# Patient Record
Sex: FEMALE | Race: WHITE | HISPANIC OR LATINO | URBAN - METROPOLITAN AREA
[De-identification: names, ages, dates, MRNs, and addresses within clinical notes are randomized per-mention and may not be internally consistent; named-entity substitution may affect disease eponyms.]

---

## 2023-11-04 ENCOUNTER — APPOINTMENT (OUTPATIENT)
Dept: GENERAL RADIOLOGY | Facility: HOSPITAL | Age: 38
End: 2023-11-04
Payer: COMMERCIAL

## 2023-11-04 ENCOUNTER — HOSPITAL ENCOUNTER (EMERGENCY)
Facility: HOSPITAL | Age: 38
Discharge: HOME | End: 2023-11-04
Attending: EMERGENCY MEDICINE
Payer: COMMERCIAL

## 2023-11-04 VITALS
WEIGHT: 148 LBS | SYSTOLIC BLOOD PRESSURE: 124 MMHG | DIASTOLIC BLOOD PRESSURE: 81 MMHG | BODY MASS INDEX: 23.78 KG/M2 | HEIGHT: 66 IN | TEMPERATURE: 98.5 F | RESPIRATION RATE: 16 BRPM | HEART RATE: 82 BPM | OXYGEN SATURATION: 94 %

## 2023-11-04 DIAGNOSIS — S39.012A ACUTE MYOFASCIAL STRAIN OF LUMBAR REGION, INITIAL ENCOUNTER: Primary | ICD-10-CM

## 2023-11-04 LAB
B-HCG UR QL: NEGATIVE
BILIRUB UR QL STRIP: NEGATIVE
CLARITY UR: CLEAR
COLOR UR: YELLOW
GLUCOSE UR STRIP-MCNC: NEGATIVE MG/DL
HGB UR QL STRIP.AUTO: NEGATIVE
KETONES UR QL STRIP: ABNORMAL
LEUKOCYTE ESTERASE UR QL STRIP.AUTO: NEGATIVE
NITRITE UR QL STRIP: NEGATIVE
PH UR STRIP.AUTO: 5.5 [PH] (ref 5–8)
PROT UR QL STRIP: NEGATIVE
SP GR UR STRIP: >=1.03 (ref 1–1.03)
UROBILINOGEN UR QL STRIP: ABNORMAL

## 2023-11-04 PROCEDURE — 99283 EMERGENCY DEPT VISIT LOW MDM: CPT

## 2023-11-04 PROCEDURE — 96372 THER/PROPH/DIAG INJ SC/IM: CPT

## 2023-11-04 PROCEDURE — 81025 URINE PREGNANCY TEST: CPT | Performed by: EMERGENCY MEDICINE

## 2023-11-04 PROCEDURE — 72110 X-RAY EXAM L-2 SPINE 4/>VWS: CPT

## 2023-11-04 PROCEDURE — 25010000002 KETOROLAC TROMETHAMINE PER 15 MG: Performed by: EMERGENCY MEDICINE

## 2023-11-04 PROCEDURE — 81003 URINALYSIS AUTO W/O SCOPE: CPT | Performed by: EMERGENCY MEDICINE

## 2023-11-04 RX ORDER — NAPROXEN 500 MG/1
500 TABLET ORAL 2 TIMES DAILY WITH MEALS
Qty: 14 TABLET | Refills: 0 | Status: SHIPPED | OUTPATIENT
Start: 2023-11-04

## 2023-11-04 RX ORDER — LIDOCAINE 50 MG/G
1 PATCH TOPICAL EVERY 24 HOURS
Qty: 15 EACH | Refills: 0 | Status: SHIPPED | OUTPATIENT
Start: 2023-11-04

## 2023-11-04 RX ORDER — CYCLOBENZAPRINE HCL 10 MG
10 TABLET ORAL ONCE
Status: COMPLETED | OUTPATIENT
Start: 2023-11-04 | End: 2023-11-04

## 2023-11-04 RX ORDER — HYDROCODONE BITARTRATE AND ACETAMINOPHEN 7.5; 325 MG/1; MG/1
1 TABLET ORAL ONCE
Status: COMPLETED | OUTPATIENT
Start: 2023-11-04 | End: 2023-11-04

## 2023-11-04 RX ORDER — LIDOCAINE 50 MG/G
1 PATCH TOPICAL
Status: DISCONTINUED | OUTPATIENT
Start: 2023-11-04 | End: 2023-11-04 | Stop reason: HOSPADM

## 2023-11-04 RX ORDER — CYCLOBENZAPRINE HCL 10 MG
10 TABLET ORAL 3 TIMES DAILY PRN
Qty: 15 TABLET | Refills: 0 | Status: SHIPPED | OUTPATIENT
Start: 2023-11-04

## 2023-11-04 RX ORDER — KETOROLAC TROMETHAMINE 30 MG/ML
60 INJECTION, SOLUTION INTRAMUSCULAR; INTRAVENOUS ONCE
Status: COMPLETED | OUTPATIENT
Start: 2023-11-04 | End: 2023-11-04

## 2023-11-04 RX ADMIN — KETOROLAC TROMETHAMINE 60 MG: 30 INJECTION INTRAMUSCULAR; INTRAVENOUS at 15:19

## 2023-11-04 RX ADMIN — CYCLOBENZAPRINE 10 MG: 10 TABLET, FILM COATED ORAL at 13:29

## 2023-11-04 RX ADMIN — HYDROCODONE BITARTRATE AND ACETAMINOPHEN 1 TABLET: 7.5; 325 TABLET ORAL at 13:28

## 2023-11-04 RX ADMIN — LIDOCAINE 1 PATCH: 50 PATCH TOPICAL at 13:29

## 2023-11-04 NOTE — DISCHARGE INSTRUCTIONS
Go home and rest for the next 24 hours.  Take medications as needed.  Follow-up with your doctor if not better in several days

## 2023-11-04 NOTE — ED PROVIDER NOTES
EMERGENCY DEPARTMENT ENCOUNTER    Room Number:    Date seen:  2023  PCP: Roaslio Drake APRN  Historian: Patient      HPI:  Chief Complaint: Back pain  Context: Gabriela Alcazar is a 38 y.o. female who presents to the ED c/o right back pain since yesterday.  The patient works as a caregiver.  She states he had the same pain approximately 14 years ago and was put on medications and was told she has back problems.      PAST MEDICAL HISTORY  Active Ambulatory Problems     Diagnosis Date Noted    No Active Ambulatory Problems     Resolved Ambulatory Problems     Diagnosis Date Noted    No Resolved Ambulatory Problems     Past Medical History:   Diagnosis Date    Migraines          REVIEW OF SYSTEMS  All systems reviewed and negative except for those discussed in HPI.       PAST SURGICAL HISTORY  Past Surgical History:   Procedure Laterality Date    BREAST AUGMENTATION      BREAST SURGERY      implant removal     SECTION WITH TUBAL           FAMILY HISTORY  Family History   Problem Relation Age of Onset    Breast cancer Maternal Aunt     Ovarian cancer Neg Hx     Uterine cancer Neg Hx     Colon cancer Neg Hx          SOCIAL HISTORY  Social History     Socioeconomic History    Marital status: Single   Tobacco Use    Smoking status: Never    Smokeless tobacco: Never   Vaping Use    Vaping Use: Never used   Substance and Sexual Activity    Alcohol use: Not Currently    Drug use: Never    Sexual activity: Yes     Partners: Male     Birth control/protection: Tubal ligation         ALLERGIES  Patient has no known allergies.      PHYSICAL EXAM  ED Triage Vitals   Temp Heart Rate Resp BP SpO2   23 1150 23 1150 23 1150 23 1151 23 1150   98.5 °F (36.9 °C) 82 16 124/81 94 %      Temp src Heart Rate Source Patient Position BP Location FiO2 (%)   23 1150 23 1150 -- -- --   Tympanic Monitor          Physical Exam      GENERAL: 38-year-old female in no acute distress  HENT:  NCAT: nares patent: Neck supple  EYES: no scleral icterus  CV: regular rhythm, normal rate  RESPIRATORY: normal effort  ABDOMEN: soft, NTND: Bowel sounds positive  Back: Mild right paraspinal tenderness but no midline tenderness  MUSCULOSKELETAL: no deformity: Full range of motion of all 4 extremities without difficulty.  NEURO: alert and oriented x3 with a normal neuro exam: Specifically 5/5 strength in bilateral lower extremities with a stable gait  PSYCH:  calm, cooperative  SKIN: warm, dry    Vital signs and nursing notes reviewed.      LAB RESULTS  Recent Results (from the past 24 hour(s))   Pregnancy, Urine - Urine, Clean Catch    Collection Time: 11/04/23  1:30 PM    Specimen: Urine, Clean Catch   Result Value Ref Range    HCG, Urine QL Negative Negative   Urinalysis With Microscopic If Indicated (No Culture) - Urine, Clean Catch    Collection Time: 11/04/23  1:30 PM    Specimen: Urine, Clean Catch   Result Value Ref Range    Color, UA Yellow Yellow, Straw    Appearance, UA Clear Clear    pH, UA 5.5 5.0 - 8.0    Specific Gravity, UA >=1.030 1.005 - 1.030    Glucose, UA Negative Negative    Ketones, UA 15 mg/dL (1+) (A) Negative    Bilirubin, UA Negative Negative    Blood, UA Negative Negative    Protein, UA Negative Negative    Leuk Esterase, UA Negative Negative    Nitrite, UA Negative Negative    Urobilinogen, UA 0.2 E.U./dL 0.2 - 1.0 E.U./dL       Ordered the above labs and reviewed the results.        RADIOLOGY  XR Spine Lumbar Complete 4+VW    Result Date: 11/4/2023  XR SPINE LUMBAR COMPLETE 4+VW-  INDICATIONS: Pain.  TECHNIQUE: 5 VIEWS OF THE LUMBAR SPINE  COMPARISON: None available  FINDINGS:  No acute fracture, erosion, or dislocation is identified. Mild to space narrowing at L5/S1. Vertebral body heights appear preserved. No acute fracture is identified. If further imaging evaluation is indicated, MRI could be considered.       As described.    This report was finalized on 11/4/2023 3:12 PM by   Balwinder Prado M.D [facility]       Ordered the above noted radiological studies. Reviewed by me in PACS.            PROCEDURES  Procedures          MEDICATIONS GIVEN IN ER  Medications   lidocaine (LIDODERM) 5 % 1 patch (1 patch Transdermal Medication Applied 11/4/23 1329)   HYDROcodone-acetaminophen (NORCO) 7.5-325 MG per tablet 1 tablet (1 tablet Oral Given 11/4/23 1328)   cyclobenzaprine (FLEXERIL) tablet 10 mg (10 mg Oral Given 11/4/23 1329)   ketorolac (TORADOL) injection 60 mg (60 mg Intramuscular Given 11/4/23 1519)             MEDICAL DECISION MAKING, PROGRESS, and CONSULTS    All labs have been independently reviewed by me.  All radiology studies have been reviewed by me and I have also reviewed the radiology report.   EKG's independently viewed and interpreted by me.  Discussion below represents my analysis of pertinent findings related to patient's condition, differential diagnosis, treatment plan and final disposition.          Orders placed during this visit:  Orders Placed This Encounter   Procedures    XR Spine Lumbar Complete 4+VW    Pregnancy, Urine - Urine, Clean Catch    Urinalysis With Microscopic If Indicated (No Culture) - Urine, Clean Catch         Differential diagnosis:  My differential diagnosis includes but is not limited to lumbar strain, compression fracture, UTI or kidney stone      Independent interpretation of labs, radiology studies, and discussions with consultants:  ED Course as of 11/04/23 1544   Sat Nov 04, 2023   1525 My independent interpretation of the patient's L-spine x-rays are no fracture or dislocation but she does have some mild degenerative disc disease. [GP]   1527 On repeat evaluation the patient is now feeling better. [GP]      ED Course User Index  [GP] Jose Miguel Cisneros MD               DIAGNOSIS  Final diagnoses:   Acute myofascial strain of lumbar region, initial encounter         DISPOSITION  Discharged            Latest Documented Vital  Signs:  As of 15:44 EDT  BP- 124/81 HR- 82 Temp- 98.5 °F (36.9 °C) (Tympanic) O2 sat- 94%--      --------------------  Please note that portions of this were completed with a voice recognition program.       Note Disclaimer: At [facility], we believe that sharing information builds trust and better relationships. You are receiving this note because you are receiving care at [facility] or recently visited. It is possible you will see health information before a provider has talked with you about it. This kind of information can be easy to misunderstand. To help you fully understand what it means for your health, we urge you to discuss this note with your provider.             Jose Miguel Cisneros MD  11/04/23 0054

## 2023-11-27 ENCOUNTER — OFFICE VISIT (OUTPATIENT)
Dept: OBSTETRICS AND GYNECOLOGY | Facility: CLINIC | Age: 38
End: 2023-11-27
Payer: COMMERCIAL

## 2023-11-27 VITALS
SYSTOLIC BLOOD PRESSURE: 118 MMHG | DIASTOLIC BLOOD PRESSURE: 80 MMHG | BODY MASS INDEX: 26.58 KG/M2 | HEIGHT: 66 IN | WEIGHT: 165.4 LBS

## 2023-11-27 DIAGNOSIS — N92.0 MENORRHAGIA WITH REGULAR CYCLE: Primary | ICD-10-CM

## 2023-11-27 DIAGNOSIS — D50.0 IRON DEFICIENCY ANEMIA DUE TO CHRONIC BLOOD LOSS: ICD-10-CM

## 2023-11-27 DIAGNOSIS — Z11.3 SCREEN FOR SEXUALLY TRANSMITTED DISEASES: ICD-10-CM

## 2023-11-27 DIAGNOSIS — N89.8 VAGINAL DISCHARGE: ICD-10-CM

## 2023-11-27 PROCEDURE — 1159F MED LIST DOCD IN RCRD: CPT | Performed by: OBSTETRICS & GYNECOLOGY

## 2023-11-27 PROCEDURE — 99204 OFFICE O/P NEW MOD 45 MIN: CPT | Performed by: OBSTETRICS & GYNECOLOGY

## 2023-11-27 PROCEDURE — 1160F RVW MEDS BY RX/DR IN RCRD: CPT | Performed by: OBSTETRICS & GYNECOLOGY

## 2023-11-27 NOTE — PROGRESS NOTES
Chief Complaint  New Gyn (Patient is here C/O extremely heavy menses that last for several days. Started after having a baby 7 years ago.  Also having discharge)    Subjective        Gabriela Alcazar presents to [facility]  History of Present Illness  Patient is here to discuss 2 major issues: Heavy menses and persistent vaginal discharge.  Regarding her menses, she states they have been very heavy for a long time, roughly since her last delivery about 7 years ago.  She did undergo tubal ligation at the time of her last .  She says her menses last about 7-10 days and are heavy the entire time with the passage of clots.  Patient does report longstanding issues with iron deficiency anemia because of the heaviness of her periods.  She reports that prior to her most recent delivery she had used Depo-Provera for contraception and states that she had amenorrhea with Depo-Provera and had been very happy with this.  Her gynecologist at the time told her that she should not be on the Depo-Provera for an extended period of time and recommended that she stop it.  Patient had a Pap smear about 1 year ago that was normal.  Patient is also reporting concerns about persistent vaginal discharge.  Says this has been ongoing for years as well.  She reports that she notices the discharge every day.  She says at times she will even notice liquid running out.  She says that the discharge is clear to white in color and without an odor.    Menstrual History:  OB History          4    Para        Term                AB   2    Living   2         SAB   2    IAB        Ectopic        Molar        Multiple        Live Births   2               CD x 2, with BTL  Patient's last menstrual period was 2023 (exact date).     Past Medical History:   Diagnosis Date    Migraines      Past Surgical History:   Procedure Laterality Date    BREAST AUGMENTATION      BREAST SURGERY      implant removal      SECTION WITH TUBAL       Social History     Tobacco Use    Smoking status: Never    Smokeless tobacco: Never   Vaping Use    Vaping Use: Never used   Substance Use Topics    Alcohol use: Not Currently    Drug use: Never     Family History   Problem Relation Age of Onset    Breast cancer Maternal Aunt     Ovarian cancer Neg Hx     Uterine cancer Neg Hx     Colon cancer Neg Hx      Current Outpatient Medications on File Prior to Visit   Medication Sig    lidocaine (LIDODERM) 5 % Place 1 patch on the skin as directed by provider Daily. Remove & Discard patch within 12 hours or as directed by MD    Qulipta 60 MG tablet take 1 tablet by mouth 1 time each day    Ubrelvy 100 MG tablet TAKE 1 TABLET BY MOUTH IF NEEDED FOR MIGRAINE    cyclobenzaprine (FLEXERIL) 10 MG tablet Take 1 tablet by mouth 3 (Three) Times a Day As Needed for Muscle Spasms. (Patient not taking: Reported on 2023)    naproxen (EC NAPROSYN) 500 MG EC tablet Take 1 tablet by mouth 2 (Two) Times a Day With Meals. (Patient not taking: Reported on 2023)     No current facility-administered medications on file prior to visit.     No Known Allergies    Review of systems:  Constitutional: No fevers, chills, sweats   Eye: No recent visual problems, denies blurry vision   HEENT: No ear pain, nasal congestion, sore throat, voice changes   Respiratory: No shortness of breath, cough, pain on breathing   Cardiovascular: No Chest pain, palpitations   Gastrointestinal: No nausea, vomiting, diarrhea, constipation   Genitourinary: No hematuria, dysuria, lesions on genitalia   Hema/Lymph: Negative for bruising, no edema   Endocrine: Negative for excessive thirst, excessive hunger, heat or cold intolerance   Musculoskeletal: No joint pain, muscle pain, decreased range of motion   Integumentary: No rash, pruritus, abrasions, lesions   Neurologic: No weakness, numbness, headaches   Psychiatric: No anxiety, depression, mood changes       Objective   Vital  "Signs:  /80   Ht 167.6 cm (66\")   Wt 75 kg (165 lb 6.4 oz)   BMI 26.70 kg/m²   Estimated body mass index is 26.7 kg/m² as calculated from the following:    Height as of this encounter: 167.6 cm (66\").    Weight as of this encounter: 75 kg (165 lb 6.4 oz).             Physical Exam   Exam performed in the presence of a female chaperone  Patient has provided verbal consent to proceed with exam.    Gen: No acute distress, awake and oriented times three  HENT: Normocephalic, atraumatic, Moist mucous membranes  Eyes: PERRLA, EOMI  Lungs: Normal work of breathing, lungs clear bilaterally  Abdomen: soft, nontender, no masses or hernia, non distended, normoactive bowel sounds  Normal external female genitalia, no lesions  Urethra: Normal meatus, no caruncle  Bladder: nontender  Vagina: No blood; no significant discharge noted on exam, mostly seems consistent with physiologic appearing discharge  Cervix: No cervical motion tenderness, no lesions, no active bleeding, nonfriable  Uterus: Anteverted, normal size and shape, nontender  Adnexa: No masses or tenderness  External anal exam: Normal appearance, no lesions or hemorrhoids  Rectal: Deferred  Skin: Warm and dry, no rashes  Psych: Good judgement and insight, normal affect and mood  Neuro: CN 2-12 intact, no gross deficits    Result Review :          Pap (11/2022):  DIAGNOSIS:     NEGATIVE (No evidence of intraepithelial lesions or malignancy)     SPECIMEN ADEQUACY:   Satisfactory for evaluation: Endocervical cells present.        **Electronically Signed Out By  Noreen Gonzalez on 11/16/2022 **       Interpretation performed at:   [facility]   [address] [place] [address], [address]   [place] [address]           Test Name       Result      Result Date/Time      Result Flag .   HPV High Risk Negative 11/5/2022 09:37                 Reference range for HPV high risk testing is Negative. Testing by [facility]   high risk for human papillomavirus high risk "   types(16,18,31,33,35,39,45,51,52,56,58,59,66 and 68)            Assessment and Plan   Diagnoses and all orders for this visit:    1. Menorrhagia with regular cycle (Primary)  -     TSH Rfx On Abnormal To Free T4  -     CBC Auto Differential  -     Ferritin  -     US Non-ob Transvaginal; Future    We will check labs as above and also obtain pelvic ultrasound to evaluate for potential cause of her heavy and prolonged menses.  We discussed management options.  Patient has done very well in the past with Depo-Provera.  She says that she had amenorrhea for 3 years while using the Depo-Provera.  Explained that this could be a good first-line approach to try to treat her heavy menses at this time.  We will consider repeat Depo-Provera therapy pending lab and ultrasound results.    2. Vaginal discharge  -     NuSwab VG+ - Swab, Vagina  -     Genital Mycoplasmas ROQUE, Swab - Swab, Vagina    No obvious pathologic process noted on exam today.  Could be physiologic.  Will check cultures as above.    3. Screen for sexually transmitted diseases  -     NuSwab VG+ - Swab, Vagina  -     Genital Mycoplasmas ROQUE, Swab - Swab, Vagina    4. Iron deficiency anemia due to chronic blood loss  -     CBC Auto Differential  -     Ferritin  -     Hemoglobinopathy Fractionation Cascade             Follow Up   Return GYn US 1-2 weeks at [facility] and GYN FU after US.  Patient was given instructions and counseling regarding her condition or for health maintenance advice. Please see specific information pulled into the AVS if appropriate.

## 2023-11-28 LAB
BASOPHILS # BLD AUTO: 0 X10E3/UL (ref 0–0.2)
BASOPHILS NFR BLD AUTO: 0 %
EOSINOPHIL # BLD AUTO: 0.1 X10E3/UL (ref 0–0.4)
EOSINOPHIL NFR BLD AUTO: 1 %
ERYTHROCYTE [DISTWIDTH] IN BLOOD BY AUTOMATED COUNT: 12.7 % (ref 11.7–15.4)
FERRITIN SERPL-MCNC: 10 NG/ML (ref 15–150)
HCT VFR BLD AUTO: 36.2 % (ref 34–46.6)
HGB A MFR BLD ELPH: 97.8 % (ref 96.4–98.8)
HGB A2 MFR BLD ELPH: 2.2 % (ref 1.8–3.2)
HGB BLD-MCNC: 11.8 G/DL (ref 11.1–15.9)
HGB F MFR BLD ELPH: 0 % (ref 0–2)
HGB FRACT BLD-IMP: NORMAL
HGB S MFR BLD ELPH: 0 %
IMM GRANULOCYTES # BLD AUTO: 0 X10E3/UL (ref 0–0.1)
IMM GRANULOCYTES NFR BLD AUTO: 0 %
LYMPHOCYTES # BLD AUTO: 2.4 X10E3/UL (ref 0.7–3.1)
LYMPHOCYTES NFR BLD AUTO: 24 %
MCH RBC QN AUTO: 25.8 PG (ref 26.6–33)
MCHC RBC AUTO-ENTMCNC: 32.6 G/DL (ref 31.5–35.7)
MCV RBC AUTO: 79 FL (ref 79–97)
MONOCYTES # BLD AUTO: 0.7 X10E3/UL (ref 0.1–0.9)
MONOCYTES NFR BLD AUTO: 7 %
NEUTROPHILS # BLD AUTO: 6.5 X10E3/UL (ref 1.4–7)
NEUTROPHILS NFR BLD AUTO: 68 %
PLATELET # BLD AUTO: 322 X10E3/UL (ref 150–450)
RBC # BLD AUTO: 4.57 X10E6/UL (ref 3.77–5.28)
TSH SERPL DL<=0.005 MIU/L-ACNC: 1.52 UIU/ML (ref 0.45–4.5)
WBC # BLD AUTO: 9.6 X10E3/UL (ref 3.4–10.8)

## 2023-11-29 LAB
A VAGINAE DNA VAG QL NAA+PROBE: NORMAL SCORE
BVAB2 DNA VAG QL NAA+PROBE: NORMAL SCORE
C ALBICANS DNA VAG QL NAA+PROBE: NEGATIVE
C GLABRATA DNA VAG QL NAA+PROBE: NEGATIVE
C TRACH DNA VAG QL NAA+PROBE: NEGATIVE
M GENITALIUM DNA SPEC QL NAA+PROBE: NEGATIVE
M HOMINIS DNA SPEC QL NAA+PROBE: NEGATIVE
MEGA1 DNA VAG QL NAA+PROBE: NORMAL SCORE
N GONORRHOEA DNA VAG QL NAA+PROBE: NEGATIVE
T VAGINALIS DNA VAG QL NAA+PROBE: NEGATIVE
UREAPLASMA DNA SPEC QL NAA+PROBE: NEGATIVE

## 2023-12-06 ENCOUNTER — OFFICE VISIT (OUTPATIENT)
Dept: OBSTETRICS AND GYNECOLOGY | Facility: CLINIC | Age: 38
End: 2023-12-06
Payer: COMMERCIAL

## 2023-12-06 VITALS
HEIGHT: 66 IN | WEIGHT: 166 LBS | SYSTOLIC BLOOD PRESSURE: 130 MMHG | BODY MASS INDEX: 26.68 KG/M2 | DIASTOLIC BLOOD PRESSURE: 90 MMHG

## 2023-12-06 DIAGNOSIS — D50.0 IRON DEFICIENCY ANEMIA DUE TO CHRONIC BLOOD LOSS: ICD-10-CM

## 2023-12-06 DIAGNOSIS — N92.0 MENORRHAGIA WITH REGULAR CYCLE: Primary | ICD-10-CM

## 2023-12-06 DIAGNOSIS — N89.8 VAGINAL DISCHARGE: ICD-10-CM

## 2023-12-06 RX ORDER — FERROUS SULFATE 325(65) MG
325 TABLET ORAL EVERY OTHER DAY
Qty: 15 TABLET | Refills: 2 | Status: SHIPPED | OUTPATIENT
Start: 2023-12-06

## 2023-12-06 RX ORDER — MEDROXYPROGESTERONE ACETATE 150 MG/ML
150 INJECTION, SUSPENSION INTRAMUSCULAR
Qty: 1 ML | Refills: 3 | Status: SHIPPED | OUTPATIENT
Start: 2023-12-06

## 2023-12-06 NOTE — PROGRESS NOTES
"Chief Complaint  Gynecologic Exam (Patient is here today to follow after u/s- menorrhagia)    Subjective        Gabriela Alcazar presents to [facility]  History of Present Illness  Patient is here for follow-up of heavy menses.  She had an ultrasound done today.  Recent lab work was performed and is included below.    Patient's last menstrual period was 11/17/2023 (exact date).    Objective   Vital Signs:  /90   Ht 167.6 cm (66\")   Wt 75.3 kg (166 lb)   BMI 26.79 kg/m²   Estimated body mass index is 26.79 kg/m² as calculated from the following:    Height as of this encounter: 167.6 cm (66\").    Weight as of this encounter: 75.3 kg (166 lb).             Physical Exam   General: No acute distress, awake and oriented x3  Psychiatric: good judgment and insight, normal mood  Neurological: cranial nerves II through XII intact, no deficits    Result Review :          Office Visit on 11/27/2023   Component Date Value Ref Range Status    TSH 11/27/2023 1.520  0.450 - 4.500 uIU/mL Final    Ferritin 11/27/2023 10 (L)  15 - 150 ng/mL Final    Hgb F Quant 11/27/2023 0.0  0.0 - 2.0 % Final    Hgb A 11/27/2023 97.8  96.4 - 98.8 % Final    Hgb A2 Quant 11/27/2023 2.2  1.8 - 3.2 % Final    Hgb S 11/27/2023 0.0  0.0 % Final    Hgb Interp. 11/27/2023 Comment   Final    Comment: Normal hemoglobin present; no hemoglobin variant or beta thalassemia  identified.  Note: Alpha thalassemia may not be detected by the Hgb Fractionation  Cascade panel. If alpha thalassemia is suspected, [facility] offers  Alpha-Thalassemia DNA Analysis (#[id number]).      Atopobium Vaginae 11/27/2023 Low - 0  Score Final    BVAB 2 11/27/2023 Low - 0  Score Final    Megasphaera 1 11/27/2023 Low - 0  Score Final    Comment: Calculate total score by adding the 3 individual bacterial  vaginosis (BV) marker scores together.  Total score is  interpreted as follows:  Total score 0-1: Indicates the absence of BV.  Total score   2: " Indeterminate for BV. Additional clinical                   data should be evaluated to establish a                   diagnosis.  Total score 3-6: Indicates the presence of BV.  This test was developed and its performance characteristics  determined by [facility].  It has not been cleared or approved  by the Food and Drug Administration.      Amara Albicans, ROQUE 11/27/2023 Negative  Negative Final    Amara Glabrata, ROQUE 11/27/2023 Negative  Negative Final    Trichomonas vaginosis 11/27/2023 Negative  Negative Final    Chlamydia trachomatis, ROQUE 11/27/2023 Negative  Negative Final    Neisseria gonorrhoeae, ROQUE 11/27/2023 Negative  Negative Final    Mycoplasma genitalium NA 11/27/2023 Negative  Negative Final    Mycoplasma hominis 11/27/2023 Negative  Negative Final    Ureaplasma spp 11/27/2023 Negative  Negative Final    WBC 11/27/2023 9.6  3.4 - 10.8 x10E3/uL Final    RBC 11/27/2023 4.57  3.77 - 5.28 x10E6/uL Final    Hemoglobin 11/27/2023 11.8  11.1 - 15.9 g/dL Final    Hematocrit 11/27/2023 36.2  34.0 - 46.6 % Final    MCV 11/27/2023 79  79 - 97 fL Final    MCH 11/27/2023 25.8 (L)  26.6 - 33.0 pg Final    MCHC 11/27/2023 32.6  31.5 - 35.7 g/dL Final    RDW 11/27/2023 12.7  11.7 - 15.4 % Final    Platelets 11/27/2023 322  150 - 450 x10E3/uL Final    Neutrophil Rel % 11/27/2023 68  Not Estab. % Final    Lymphocyte Rel % 11/27/2023 24  Not Estab. % Final    Monocyte Rel % 11/27/2023 7  Not Estab. % Final    Eosinophil Rel % 11/27/2023 1  Not Estab. % Final    Basophil Rel % 11/27/2023 0  Not Estab. % Final    Neutrophils Absolute 11/27/2023 6.5  1.4 - 7.0 x10E3/uL Final    Lymphocytes Absolute 11/27/2023 2.4  0.7 - 3.1 x10E3/uL Final    Monocytes Absolute 11/27/2023 0.7  0.1 - 0.9 x10E3/uL Final    Eosinophils Absolute 11/27/2023 0.1  0.0 - 0.4 x10E3/uL Final    Basophils Absolute 11/27/2023 0.0  0.0 - 0.2 x10E3/uL Final    Immature Granulocyte Rel % 11/27/2023 0  Not Estab. % Final    Immature Grans Absolute  11/27/2023 0.0  0.0 - 0.1 x10E3/uL Final     Ultrasound today:  Findings:  Uterus measures 8.95 x 4.63 x 4.73 cm, volume 102.628 cm cube  There are no intramural or subserosal fibroids or masses identified.  There are some myometrial findings suggestive of possible adenomyosis.  Loss of the junctional zone is appreciated.  There are some cystic structures in the anterior myometrium  Endometrial thickness measures 1.00 cm, and is homogenous appearance without evidence of polyps.  Cervix is normal-appearing.    Left ovary: 2.23 x 0.99 x 1.42 cm, volume 1.641 cm cube  There is no adnexal mass or cyst identified.    Right ovary: 3.06 x 1.69 x 2.00 cm, volume 5.415 cm cube  There is no adnexal mass or cyst identified.    There is no free fluid.      Impression:    Uterus with possible appearance of adenomyosis, otherwise normal findings.  No fibroids or polyps identified.  Normal ovaries.           Assessment and Plan   Diagnoses and all orders for this visit:    1. Menorrhagia with regular cycle (Primary)  -     ferrous sulfate 325 (65 FE) MG tablet; Take 1 tablet by mouth Every Other Day.  Dispense: 15 tablet; Refill: 2  -     medroxyPROGESTERone (DEPO-PROVERA) 150 MG/ML injection; Inject 1 mL into the appropriate muscle as directed by prescriber Every 3 (Three) Months.  Dispense: 1 mL; Refill: 3    Findings of the ultrasound and recent blood work were discussed with the patient.  Ultrasound has some features possibly suggestive of adenomyosis.  Otherwise no obvious cause of her heavy menses were noted.  Patient states that she has done well on Depo-Provera in the past.  We discussed therapeutic options including medical and surgical options.  Medical therapy is recommended as a first-line approach.  As she has previously done well with Depo-Provera, my recommendation at this time will be Depo-Provera again.  Patient agrees to plan.  Risk, benefits, alternatives, and side effects discussed.  Instructions for  Depo-Provera discussed.  She should return for Depo-Provera injection in a every 12 weeks.    2. Vaginal discharge  Explained recent vaginal cultures were normal.  Feel that her discharge may be physiologic in nature.  Reassurance offered.    3. Iron deficiency anemia due to chronic blood loss  -     ferrous sulfate 325 (65 FE) MG tablet; Take 1 tablet by mouth Every Other Day.  Dispense: 15 tablet; Refill: 2    Mild iron deficiency noted on recent labs.  This likely due to heavy menses.  We will start oral iron replacement, and hopefully can discontinue this if she is seeing improvement in her menstrual cycles with the Depo-Provera.         Follow Up   Return RN visit for DMPA.  Patient was given instructions and counseling regarding her condition or for health maintenance advice. Please see specific information pulled into the AVS if appropriate.

## 2023-12-08 ENCOUNTER — CLINICAL SUPPORT (OUTPATIENT)
Dept: OBSTETRICS AND GYNECOLOGY | Facility: CLINIC | Age: 38
End: 2023-12-08
Payer: COMMERCIAL

## 2023-12-08 DIAGNOSIS — Z30.42 ENCOUNTER FOR DEPO-PROVERA CONTRACEPTION: Primary | ICD-10-CM

## 2023-12-08 RX ORDER — MEDROXYPROGESTERONE ACETATE 150 MG/ML
150 INJECTION, SUSPENSION INTRAMUSCULAR ONCE
Status: COMPLETED | OUTPATIENT
Start: 2023-12-08 | End: 2023-12-08

## 2023-12-08 RX ADMIN — MEDROXYPROGESTERONE ACETATE 150 MG: 150 INJECTION, SUSPENSION INTRAMUSCULAR at 12:10

## 2024-02-29 ENCOUNTER — CLINICAL SUPPORT (OUTPATIENT)
Dept: OBSTETRICS AND GYNECOLOGY | Facility: CLINIC | Age: 39
End: 2024-02-29
Payer: COMMERCIAL

## 2024-02-29 VITALS — WEIGHT: 174 LBS | BODY MASS INDEX: 28.08 KG/M2

## 2024-02-29 DIAGNOSIS — Z30.42 ENCOUNTER FOR DEPO-PROVERA CONTRACEPTION: Primary | ICD-10-CM

## 2024-02-29 RX ORDER — MEDROXYPROGESTERONE ACETATE 150 MG/ML
150 INJECTION, SUSPENSION INTRAMUSCULAR ONCE
Status: COMPLETED | OUTPATIENT
Start: 2024-02-29 | End: 2024-02-29

## 2024-02-29 RX ADMIN — MEDROXYPROGESTERONE ACETATE 150 MG: 150 INJECTION, SUSPENSION INTRAMUSCULAR at 15:04

## 2024-02-29 NOTE — PROGRESS NOTES
Pt here today for pt supplied depo injection, tolerated without a reaction. Rt Ventrogluteal  NDC:27584-228-80  LOT:[id number]  EXP:03/2026

## 2024-05-16 ENCOUNTER — TELEPHONE (OUTPATIENT)
Dept: OBSTETRICS AND GYNECOLOGY | Facility: CLINIC | Age: 39
End: 2024-05-16

## 2024-05-16 DIAGNOSIS — N92.0 MENORRHAGIA WITH REGULAR CYCLE: ICD-10-CM

## 2024-05-16 RX ORDER — MEDROXYPROGESTERONE ACETATE 150 MG/ML
150 INJECTION, SUSPENSION INTRAMUSCULAR
Qty: 1 ML | Refills: 3 | Status: SHIPPED | OUTPATIENT
Start: 2024-05-16

## 2024-05-16 NOTE — TELEPHONE ENCOUNTER
Patient's current [facility] pharmacy does not have depo in stock. I called this [facility]'s and they do have it in stock. Do you mind to send the depo to the [facility]?    Thanks!    [id number] - [facility] [address] AT [facility] [address] [facility] - [phone] [facility] [phone] FX

## 2024-05-21 ENCOUNTER — CLINICAL SUPPORT (OUTPATIENT)
Dept: OBSTETRICS AND GYNECOLOGY | Facility: CLINIC | Age: 39
End: 2024-05-21
Payer: COMMERCIAL

## 2024-05-21 DIAGNOSIS — Z30.42 ENCOUNTER FOR MANAGEMENT AND INJECTION OF DEPO-PROVERA: Primary | ICD-10-CM

## 2024-05-21 RX ORDER — MEDROXYPROGESTERONE ACETATE 150 MG/ML
150 INJECTION, SUSPENSION INTRAMUSCULAR ONCE
Status: COMPLETED | OUTPATIENT
Start: 2024-05-21 | End: 2024-05-21

## 2024-05-21 RX ADMIN — MEDROXYPROGESTERONE ACETATE 150 MG: 150 INJECTION, SUSPENSION INTRAMUSCULAR at 16:24

## 2024-05-21 NOTE — PROGRESS NOTES
Pt here for Depo provera injection. LT D    Lot#:[id number]  EXP:7/2026  NDC:45486-074-40    Pt tolerated injection

## 2024-08-27 ENCOUNTER — OFFICE VISIT (OUTPATIENT)
Dept: OBSTETRICS AND GYNECOLOGY | Facility: CLINIC | Age: 39
End: 2024-08-27
Payer: COMMERCIAL

## 2024-08-27 VITALS
BODY MASS INDEX: 30.18 KG/M2 | HEART RATE: 78 BPM | SYSTOLIC BLOOD PRESSURE: 122 MMHG | DIASTOLIC BLOOD PRESSURE: 81 MMHG | WEIGHT: 187 LBS

## 2024-08-27 DIAGNOSIS — D50.0 IRON DEFICIENCY ANEMIA DUE TO CHRONIC BLOOD LOSS: ICD-10-CM

## 2024-08-27 DIAGNOSIS — N92.1 MENORRHAGIA WITH IRREGULAR CYCLE: Primary | ICD-10-CM

## 2024-08-27 PROCEDURE — 99213 OFFICE O/P EST LOW 20 MIN: CPT | Performed by: OBSTETRICS & GYNECOLOGY

## 2024-08-27 RX ORDER — CETIRIZINE HYDROCHLORIDE 10 MG/1
1 TABLET ORAL DAILY
COMMUNITY
Start: 2024-06-19

## 2024-08-27 NOTE — PROGRESS NOTES
"Chief Complaint  Gyn follow up  (On birth control )  Follow-up on heavy menstrual cycles  Subjective        Gabriela Alcazar presents to [facility]  History of Present Illness  Patient is here for follow-up of irregular menstrual cycles.  She reports that she has now had 3 Depo-Provera injections (she states that her most recent was in May 2024; however, we do not have documentation of her receiving this Depo-Provera injection).  She says despite the Depo-Provera she has been having basically daily vaginal bleeding.  She says this seems to contribute to headaches.  She does have a previous history of anemia although her most recent hemoglobin with us in 2023 was normal.  She had an ultrasound last year which suggested adenomyosis.  Patient has no desires for future fertility.  She has had previous tubal sterilization procedure.    The following portions of the patient's history were reviewed and updated as appropriate: allergies, current medications, past family history, past medical history, past social history, past surgical history, and problem list.    Past Surgical History:   Procedure Laterality Date    BREAST AUGMENTATION      BREAST SURGERY      implant removal     SECTION WITH TUBAL         Objective   Vital Signs:  /81   Pulse 78   Wt 84.8 kg (187 lb)   BMI 30.18 kg/m²   Estimated body mass index is 30.18 kg/m² as calculated from the following:    Height as of 23: 167.6 cm (66\").    Weight as of this encounter: 84.8 kg (187 lb).          Physical Exam   General: No acute distress, awake and oriented x3  Psychiatric: good judgment and insight, normal mood  Neurological: cranial nerves II through XII intact, no deficits    Result Review :                Assessment and Plan   Diagnoses and all orders for this visit:    1. Menorrhagia with irregular cycle (Primary)  -     CBC (No Diff)  -     Ferritin  -     Iron Profile    2. Iron deficiency anemia due to " chronic blood loss  -     CBC (No Diff)  -     Ferritin  -     Iron Profile    Patient does not seem to be getting any improvement on Depo-Provera injections.  We discussed other options for medical therapy.  Various options discussed and patient would like to try Mirena IUD.  Educational handouts in [employer] were provided to the patient.  The risk, benefits, alternatives, and side effects were discussed.  We will plan for ultrasound-guided Mirena insertion next visit.  We have also discussed surgical therapy options.  We discussed the minimally invasive surgical procedure such as endometrial ablation as well as more definitive surgical procedure such as hysterectomy.  Would recommend further trial of medical therapy before pursuing surgical therapy.  She agrees with this plan.         Follow Up   Return US guided Mirena IUD insert. next avail. Irma HARTMANN, for Needs to do blood work today.  Patient was given instructions and counseling regarding her condition or for health maintenance advice. Please see specific information pulled into the AVS if appropriate.

## 2024-08-28 LAB
ERYTHROCYTE [DISTWIDTH] IN BLOOD BY AUTOMATED COUNT: 12.8 % (ref 11.7–15.4)
HCT VFR BLD AUTO: 41.3 % (ref 34–46.6)
HGB BLD-MCNC: 13 G/DL (ref 11.1–15.9)
MCH RBC QN AUTO: 26.9 PG (ref 26.6–33)
MCHC RBC AUTO-ENTMCNC: 31.5 G/DL (ref 31.5–35.7)
MCV RBC AUTO: 85 FL (ref 79–97)
PLATELET # BLD AUTO: 315 X10E3/UL (ref 150–450)
RBC # BLD AUTO: 4.84 X10E6/UL (ref 3.77–5.28)
WBC # BLD AUTO: 10.2 X10E3/UL (ref 3.4–10.8)

## 2024-08-29 LAB
FERRITIN SERPL-MCNC: 28 NG/ML (ref 15–150)
IRON SATN MFR SERPL: 18 % (ref 15–55)
IRON SERPL-MCNC: 59 UG/DL (ref 27–159)
TIBC SERPL-MCNC: 331 UG/DL (ref 250–450)
UIBC SERPL-MCNC: 272 UG/DL (ref 131–425)

## 2024-09-17 ENCOUNTER — OFFICE VISIT (OUTPATIENT)
Dept: OBSTETRICS AND GYNECOLOGY | Facility: CLINIC | Age: 39
End: 2024-09-17
Payer: COMMERCIAL

## 2024-09-17 VITALS
HEIGHT: 66 IN | HEART RATE: 73 BPM | WEIGHT: 189.4 LBS | SYSTOLIC BLOOD PRESSURE: 127 MMHG | DIASTOLIC BLOOD PRESSURE: 86 MMHG | BODY MASS INDEX: 30.44 KG/M2

## 2024-09-17 DIAGNOSIS — Z30.430 ENCOUNTER FOR IUD INSERTION: ICD-10-CM

## 2024-09-17 DIAGNOSIS — N92.1 MENORRHAGIA WITH IRREGULAR CYCLE: ICD-10-CM

## 2024-09-17 DIAGNOSIS — D50.0 IRON DEFICIENCY ANEMIA DUE TO CHRONIC BLOOD LOSS: ICD-10-CM

## 2024-09-17 DIAGNOSIS — Z32.02 PREGNANCY EXAMINATION OR TEST, NEGATIVE RESULT: Primary | ICD-10-CM

## 2024-09-17 LAB
B-HCG UR QL: NEGATIVE
EXPIRATION DATE: NORMAL
INTERNAL NEGATIVE CONTROL: NEGATIVE
INTERNAL POSITIVE CONTROL: POSITIVE
Lab: NORMAL

## 2024-09-17 PROCEDURE — 58300 INSERT INTRAUTERINE DEVICE: CPT | Performed by: OBSTETRICS & GYNECOLOGY

## 2024-09-17 PROCEDURE — 1160F RVW MEDS BY RX/DR IN RCRD: CPT | Performed by: OBSTETRICS & GYNECOLOGY

## 2024-09-17 PROCEDURE — 1159F MED LIST DOCD IN RCRD: CPT | Performed by: OBSTETRICS & GYNECOLOGY

## 2024-09-17 PROCEDURE — 81025 URINE PREGNANCY TEST: CPT | Performed by: OBSTETRICS & GYNECOLOGY

## 2024-09-17 PROCEDURE — 99213 OFFICE O/P EST LOW 20 MIN: CPT | Performed by: OBSTETRICS & GYNECOLOGY

## 2024-09-17 PROCEDURE — 76998 US GUIDE INTRAOP: CPT | Performed by: OBSTETRICS & GYNECOLOGY

## 2025-01-14 ENCOUNTER — TELEPHONE (OUTPATIENT)
Dept: OBSTETRICS AND GYNECOLOGY | Facility: CLINIC | Age: 40
End: 2025-01-14

## 2025-01-14 NOTE — TELEPHONE ENCOUNTER
Caller: Gabriela Alcazar    Relationship:  Self    Best call back number: [phone]      PATIENT CALLED REQUESTING TO CANCEL SAME DAY APPT.    Did the patient call AFTER the start time of their scheduled appointment?  []YES  [x]NO    Was the patient's appointment rescheduled? [x]YES  []NO

## 2025-01-22 ENCOUNTER — HOSPITAL ENCOUNTER (EMERGENCY)
Facility: HOSPITAL | Age: 40
Discharge: HOME | End: 2025-01-22
Attending: EMERGENCY MEDICINE | Admitting: EMERGENCY MEDICINE
Payer: COMMERCIAL

## 2025-01-22 ENCOUNTER — APPOINTMENT (OUTPATIENT)
Dept: GENERAL RADIOLOGY | Facility: HOSPITAL | Age: 40
End: 2025-01-22
Payer: COMMERCIAL

## 2025-01-22 VITALS
DIASTOLIC BLOOD PRESSURE: 86 MMHG | OXYGEN SATURATION: 96 % | RESPIRATION RATE: 20 BRPM | HEART RATE: 64 BPM | TEMPERATURE: 96.8 F | SYSTOLIC BLOOD PRESSURE: 126 MMHG

## 2025-01-22 DIAGNOSIS — B34.2 CORONAVIRUS INFECTION: ICD-10-CM

## 2025-01-22 DIAGNOSIS — J06.9 VIRAL URI WITH COUGH: Primary | ICD-10-CM

## 2025-01-22 LAB
ALBUMIN SERPL-MCNC: 4.1 G/DL (ref 3.5–5.2)
ALBUMIN/GLOB SERPL: 1.4 G/DL
ALP SERPL-CCNC: 110 U/L (ref 39–117)
ALT SERPL W P-5'-P-CCNC: 23 U/L (ref 1–33)
ANION GAP SERPL CALCULATED.3IONS-SCNC: 12 MMOL/L (ref 5–15)
AST SERPL-CCNC: 17 U/L (ref 1–32)
B PARAPERT DNA SPEC QL NAA+PROBE: NOT DETECTED
B PERT DNA SPEC QL NAA+PROBE: NOT DETECTED
BASOPHILS # BLD AUTO: 0.05 10*3/MM3 (ref 0–0.2)
BASOPHILS NFR BLD AUTO: 0.5 % (ref 0–1.5)
BILIRUB SERPL-MCNC: 0.3 MG/DL (ref 0–1.2)
BUN SERPL-MCNC: 11 MG/DL (ref 6–20)
BUN/CREAT SERPL: 13.9 (ref 7–25)
C PNEUM DNA NPH QL NAA+NON-PROBE: NOT DETECTED
CALCIUM SPEC-SCNC: 9.3 MG/DL (ref 8.6–10.5)
CHLORIDE SERPL-SCNC: 102 MMOL/L (ref 98–107)
CO2 SERPL-SCNC: 23 MMOL/L (ref 22–29)
CREAT SERPL-MCNC: 0.79 MG/DL (ref 0.57–1)
D DIMER PPP FEU-MCNC: <0.27 MCGFEU/ML (ref 0–0.5)
DEPRECATED RDW RBC AUTO: 37.8 FL (ref 37–54)
EGFRCR SERPLBLD CKD-EPI 2021: 98.3 ML/MIN/1.73
EOSINOPHIL # BLD AUTO: 0.28 10*3/MM3 (ref 0–0.4)
EOSINOPHIL NFR BLD AUTO: 2.9 % (ref 0.3–6.2)
ERYTHROCYTE [DISTWIDTH] IN BLOOD BY AUTOMATED COUNT: 12.8 % (ref 12.3–15.4)
FLUAV SUBTYP SPEC NAA+PROBE: NOT DETECTED
FLUBV RNA ISLT QL NAA+PROBE: NOT DETECTED
GEN 5 1HR TROPONIN T REFLEX: <6 NG/L
GLOBULIN UR ELPH-MCNC: 3 GM/DL
GLUCOSE SERPL-MCNC: 84 MG/DL (ref 65–99)
HADV DNA SPEC NAA+PROBE: NOT DETECTED
HCOV 229E RNA SPEC QL NAA+PROBE: NOT DETECTED
HCOV HKU1 RNA SPEC QL NAA+PROBE: NOT DETECTED
HCOV NL63 RNA SPEC QL NAA+PROBE: NOT DETECTED
HCOV OC43 RNA SPEC QL NAA+PROBE: DETECTED
HCT VFR BLD AUTO: 40.7 % (ref 34–46.6)
HGB BLD-MCNC: 13.5 G/DL (ref 12–15.9)
HMPV RNA NPH QL NAA+NON-PROBE: NOT DETECTED
HOLD SPECIMEN: NORMAL
HOLD SPECIMEN: NORMAL
HPIV1 RNA ISLT QL NAA+PROBE: NOT DETECTED
HPIV2 RNA SPEC QL NAA+PROBE: NOT DETECTED
HPIV3 RNA NPH QL NAA+PROBE: NOT DETECTED
HPIV4 P GENE NPH QL NAA+PROBE: NOT DETECTED
IMM GRANULOCYTES # BLD AUTO: 0.04 10*3/MM3 (ref 0–0.05)
IMM GRANULOCYTES NFR BLD AUTO: 0.4 % (ref 0–0.5)
LYMPHOCYTES # BLD AUTO: 1.81 10*3/MM3 (ref 0.7–3.1)
LYMPHOCYTES NFR BLD AUTO: 19 % (ref 19.6–45.3)
M PNEUMO IGG SER IA-ACNC: NOT DETECTED
MCH RBC QN AUTO: 26.9 PG (ref 26.6–33)
MCHC RBC AUTO-ENTMCNC: 33.2 G/DL (ref 31.5–35.7)
MCV RBC AUTO: 81.2 FL (ref 79–97)
MONOCYTES # BLD AUTO: 0.86 10*3/MM3 (ref 0.1–0.9)
MONOCYTES NFR BLD AUTO: 9 % (ref 5–12)
NEUTROPHILS NFR BLD AUTO: 6.51 10*3/MM3 (ref 1.7–7)
NEUTROPHILS NFR BLD AUTO: 68.2 % (ref 42.7–76)
NRBC BLD AUTO-RTO: 0 /100 WBC (ref 0–0.2)
NT-PROBNP SERPL-MCNC: <36 PG/ML (ref 0–450)
PLATELET # BLD AUTO: 277 10*3/MM3 (ref 140–450)
PMV BLD AUTO: 10.1 FL (ref 6–12)
POTASSIUM SERPL-SCNC: 3.9 MMOL/L (ref 3.5–5.2)
PROT SERPL-MCNC: 7.1 G/DL (ref 6–8.5)
QT INTERVAL: 361 MS
QTC INTERVAL: 422 MS
RBC # BLD AUTO: 5.01 10*6/MM3 (ref 3.77–5.28)
RHINOVIRUS RNA SPEC NAA+PROBE: NOT DETECTED
RSV RNA NPH QL NAA+NON-PROBE: NOT DETECTED
SARS-COV-2 RNA NPH QL NAA+NON-PROBE: NOT DETECTED
SODIUM SERPL-SCNC: 137 MMOL/L (ref 136–145)
TROPONIN T NUMERIC DELTA: NORMAL
TROPONIN T SERPL HS-MCNC: <6 NG/L
WBC NRBC COR # BLD AUTO: 9.55 10*3/MM3 (ref 3.4–10.8)
WHOLE BLOOD HOLD COAG: NORMAL
WHOLE BLOOD HOLD SPECIMEN: NORMAL

## 2025-01-22 PROCEDURE — 80053 COMPREHEN METABOLIC PANEL: CPT | Performed by: EMERGENCY MEDICINE

## 2025-01-22 PROCEDURE — 0202U NFCT DS 22 TRGT SARS-COV-2: CPT | Performed by: EMERGENCY MEDICINE

## 2025-01-22 PROCEDURE — 83880 ASSAY OF NATRIURETIC PEPTIDE: CPT | Performed by: EMERGENCY MEDICINE

## 2025-01-22 PROCEDURE — 93005 ELECTROCARDIOGRAM TRACING: CPT

## 2025-01-22 PROCEDURE — 36415 COLL VENOUS BLD VENIPUNCTURE: CPT

## 2025-01-22 PROCEDURE — 85379 FIBRIN DEGRADATION QUANT: CPT | Performed by: EMERGENCY MEDICINE

## 2025-01-22 PROCEDURE — 85025 COMPLETE CBC W/AUTO DIFF WBC: CPT | Performed by: EMERGENCY MEDICINE

## 2025-01-22 PROCEDURE — 93005 ELECTROCARDIOGRAM TRACING: CPT | Performed by: EMERGENCY MEDICINE

## 2025-01-22 PROCEDURE — 99284 EMERGENCY DEPT VISIT MOD MDM: CPT

## 2025-01-22 PROCEDURE — 71045 X-RAY EXAM CHEST 1 VIEW: CPT

## 2025-01-22 PROCEDURE — 93010 ELECTROCARDIOGRAM REPORT: CPT | Performed by: INTERNAL MEDICINE

## 2025-01-22 PROCEDURE — 84484 ASSAY OF TROPONIN QUANT: CPT | Performed by: EMERGENCY MEDICINE

## 2025-01-22 RX ORDER — PREDNISONE 50 MG/1
50 TABLET ORAL DAILY
Qty: 4 TABLET | Refills: 0 | Status: SHIPPED | OUTPATIENT
Start: 2025-01-22 | End: 2025-01-22

## 2025-01-22 RX ORDER — PREDNISONE 50 MG/1
50 TABLET ORAL DAILY
Qty: 4 TABLET | Refills: 0 | Status: SHIPPED | OUTPATIENT
Start: 2025-01-22

## 2025-01-22 RX ORDER — SODIUM CHLORIDE 0.9 % (FLUSH) 0.9 %
10 SYRINGE (ML) INJECTION AS NEEDED
Status: DISCONTINUED | OUTPATIENT
Start: 2025-01-22 | End: 2025-01-22 | Stop reason: HOSPADM

## 2025-01-22 NOTE — ED PROVIDER NOTES
EMERGENCY DEPARTMENT ENCOUNTER    Room Number:    PCP: Rosalio Drake APRN  Historian: Patient      HPI:  Chief Complaint: Shortness of breath  A complete HPI/ROS/PMH/PSH/SH/FH are unobtainable due to: None  Context: Gabriela Alcazar is a 39 y.o. female who presents to the ED c/o sudden onset of shortness of breath with an associated nonproductive cough that is now been present for the past 3 days.  She denies any known sick contacts but does report that the symptoms are currently at least moderate in intensity.  She denies any overt chest pain, back pain, extremity pain, shortness of breath, nausea/vomiting, or fever/chills.            PAST MEDICAL HISTORY  Active Ambulatory Problems     Diagnosis Date Noted    Menorrhagia with regular cycle 2023    Iron deficiency anemia due to chronic blood loss 2023     Resolved Ambulatory Problems     Diagnosis Date Noted    No Resolved Ambulatory Problems     Past Medical History:   Diagnosis Date    Migraines          PAST SURGICAL HISTORY  Past Surgical History:   Procedure Laterality Date    BREAST AUGMENTATION      BREAST SURGERY      implant removal     SECTION WITH TUBAL           FAMILY HISTORY  Family History   Problem Relation Age of Onset    Breast cancer Maternal Aunt     Ovarian cancer Neg Hx     Uterine cancer Neg Hx     Colon cancer Neg Hx          SOCIAL HISTORY  Social History     Socioeconomic History    Marital status: Single   Tobacco Use    Smoking status: Never    Smokeless tobacco: Never   Vaping Use    Vaping status: Never Used   Substance and Sexual Activity    Alcohol use: Not Currently    Drug use: Never    Sexual activity: Yes     Partners: Male     Birth control/protection: Tubal ligation         ALLERGIES  Patient has no known allergies.        REVIEW OF SYSTEMS  Review of Systems   Constitutional:  Negative for fever.   HENT:  Negative for sore throat.    Eyes: Negative.    Respiratory:  Positive for cough and shortness of  breath.    Cardiovascular:  Negative for chest pain.   Gastrointestinal:  Negative for abdominal pain, diarrhea and vomiting.   Genitourinary:  Negative for dysuria.   Musculoskeletal:  Negative for neck pain.   Skin:  Negative for rash.   Allergic/Immunologic: Negative.    Neurological:  Negative for weakness, numbness and headaches.   Hematological: Negative.    Psychiatric/Behavioral: Negative.     All other systems reviewed and are negative.         PHYSICAL EXAM  ED Triage Vitals   Temp Heart Rate Resp BP SpO2   01/22/25 0753 01/22/25 0753 01/22/25 0753 01/22/25 0757 01/22/25 0753   96.8 °F (36 °C) 110 20 118/75 99 %      Temp src Heart Rate Source Patient Position BP Location FiO2 (%)   -- -- -- -- --              Physical Exam  Constitutional:       General: She is not in acute distress.     Appearance: Normal appearance. She is not ill-appearing or toxic-appearing.   HENT:      Head: Normocephalic and atraumatic.   Eyes:      Extraocular Movements: Extraocular movements intact.      Pupils: Pupils are equal, round, and reactive to light.   Cardiovascular:      Rate and Rhythm: Normal rate and regular rhythm.      Heart sounds: No murmur heard.     No friction rub. No gallop.   Pulmonary:      Effort: Pulmonary effort is normal.      Breath sounds: Normal breath sounds.   Abdominal:      General: Abdomen is flat. There is no distension.      Palpations: Abdomen is soft.      Tenderness: There is no abdominal tenderness.   Musculoskeletal:         General: No swelling or tenderness. Normal range of motion.      Cervical back: Normal range of motion and neck supple.   Skin:     General: Skin is warm and dry.   Neurological:      General: No focal deficit present.      Mental Status: She is alert and oriented to person, place, and time.      Sensory: No sensory deficit.      Motor: No weakness.   Psychiatric:         Mood and Affect: Mood normal.         Behavior: Behavior normal.           Vital signs and  nursing notes reviewed.          LAB RESULTS  Recent Results (from the past 24 hours)   ECG 12 Lead ED Triage Standing Order; SOA    Collection Time: 01/22/25  7:56 AM   Result Value Ref Range    QT Interval 361 ms    QTC Interval 422 ms   Comprehensive Metabolic Panel    Collection Time: 01/22/25  8:16 AM    Specimen: Blood   Result Value Ref Range    Glucose 84 65 - 99 mg/dL    BUN 11 6 - 20 mg/dL    Creatinine 0.79 0.57 - 1.00 mg/dL    Sodium 137 136 - 145 mmol/L    Potassium 3.9 3.5 - 5.2 mmol/L    Chloride 102 98 - 107 mmol/L    CO2 23.0 22.0 - 29.0 mmol/L    Calcium 9.3 8.6 - 10.5 mg/dL    Total Protein 7.1 6.0 - 8.5 g/dL    Albumin 4.1 3.5 - 5.2 g/dL    ALT (SGPT) 23 1 - 33 U/L    AST (SGOT) 17 1 - 32 U/L    Alkaline Phosphatase 110 39 - 117 U/L    Total Bilirubin 0.3 0.0 - 1.2 mg/dL    Globulin 3.0 gm/dL    A/G Ratio 1.4 g/dL    BUN/Creatinine Ratio 13.9 7.0 - 25.0    Anion Gap 12.0 5.0 - 15.0 mmol/L    eGFR 98.3 >60.0 mL/min/1.73   BNP    Collection Time: 01/22/25  8:16 AM    Specimen: Blood   Result Value Ref Range    proBNP <36.0 0.0 - 450.0 pg/mL   High Sensitivity Troponin T    Collection Time: 01/22/25  8:16 AM    Specimen: Blood   Result Value Ref Range    HS Troponin T <6 <14 ng/L   Green Top (Gel)    Collection Time: 01/22/25  8:16 AM   Result Value Ref Range    Extra Tube Hold for add-ons.    Lavender Top    Collection Time: 01/22/25  8:16 AM   Result Value Ref Range    Extra Tube hold for add-on    Gold Top - SST    Collection Time: 01/22/25  8:16 AM   Result Value Ref Range    Extra Tube Hold for add-ons.    Light Blue Top    Collection Time: 01/22/25  8:16 AM   Result Value Ref Range    Extra Tube Hold for add-ons.    CBC Auto Differential    Collection Time: 01/22/25  8:16 AM    Specimen: Blood   Result Value Ref Range    WBC 9.55 3.40 - 10.80 10*3/mm3    RBC 5.01 3.77 - 5.28 10*6/mm3    Hemoglobin 13.5 12.0 - 15.9 g/dL    Hematocrit 40.7 34.0 - 46.6 %    MCV 81.2 79.0 - 97.0 fL    MCH 26.9  26.6 - 33.0 pg    MCHC 33.2 31.5 - 35.7 g/dL    RDW 12.8 12.3 - 15.4 %    RDW-SD 37.8 37.0 - 54.0 fl    MPV 10.1 6.0 - 12.0 fL    Platelets 277 140 - 450 10*3/mm3    Neutrophil % 68.2 42.7 - 76.0 %    Lymphocyte % 19.0 (L) 19.6 - 45.3 %    Monocyte % 9.0 5.0 - 12.0 %    Eosinophil % 2.9 0.3 - 6.2 %    Basophil % 0.5 0.0 - 1.5 %    Immature Grans % 0.4 0.0 - 0.5 %    Neutrophils, Absolute 6.51 1.70 - 7.00 10*3/mm3    Lymphocytes, Absolute 1.81 0.70 - 3.10 10*3/mm3    Monocytes, Absolute 0.86 0.10 - 0.90 10*3/mm3    Eosinophils, Absolute 0.28 0.00 - 0.40 10*3/mm3    Basophils, Absolute 0.05 0.00 - 0.20 10*3/mm3    Immature Grans, Absolute 0.04 0.00 - 0.05 10*3/mm3    nRBC 0.0 0.0 - 0.2 /100 WBC   D-dimer, Quantitative    Collection Time: 01/22/25  8:16 AM    Specimen: Blood   Result Value Ref Range    D-Dimer, Quantitative <0.27 0.00 - 0.50 MCGFEU/mL   Respiratory Panel PCR w/COVID-19(SARS-CoV-2) PATRICIA/SHANE/CARMINE/PAD/COR/KEVIN In-House, NP Swab in [facility], 2 HR TAT - Swab, Nasopharynx    Collection Time: 01/22/25  8:17 AM    Specimen: Nasopharynx; Swab   Result Value Ref Range    ADENOVIRUS, PCR Not Detected Not Detected    Coronavirus 229E Not Detected Not Detected    Coronavirus HKU1 Not Detected Not Detected    Coronavirus NL63 Not Detected Not Detected    Coronavirus OC43 Detected (A) Not Detected    COVID19 Not Detected Not Detected - Ref. Range    Human Metapneumovirus Not Detected Not Detected    Human Rhinovirus/Enterovirus Not Detected Not Detected    Influenza A PCR Not Detected Not Detected    Influenza B PCR Not Detected Not Detected    Parainfluenza Virus 1 Not Detected Not Detected    Parainfluenza Virus 2 Not Detected Not Detected    Parainfluenza Virus 3 Not Detected Not Detected    Parainfluenza Virus 4 Not Detected Not Detected    RSV, PCR Not Detected Not Detected    Bordetella pertussis pcr Not Detected Not Detected    Bordetella parapertussis PCR Not Detected Not Detected    Chlamydophila pneumoniae PCR  Not Detected Not Detected    Mycoplasma pneumo by PCR Not Detected Not Detected   High Sensitivity Troponin T 1Hr    Collection Time: 01/22/25  9:19 AM    Specimen: Arm, Left; Blood   Result Value Ref Range    HS Troponin T <6 <14 ng/L    Troponin T Numeric Delta         Ordered the above labs and reviewed the results.        RADIOLOGY  XR Chest 1 View    Result Date: 1/22/2025  XR CHEST 1 VW-  INDICATION: Shortness of breath  COMPARISON: None available      No focal consolidation. No pleural effusion or pneumothorax.  Normal size cardiomediastinal silhouette.  No focal osseous abnormality.   This report was finalized on 1/22/2025 8:23 AM by Dr. Mateo Aldana M.D on Workstation: [id number]       Ordered the above noted radiological studies. Reviewed by me in PACS.            PROCEDURES  Procedures    EKG independently interpreted by myself as follows:    EKG          EKG time: 0756  Rhythm/Rate: NSR, 82  P waves and DE: nml  QRS, axis: nml, nml   ST and T waves: nml     Interpreted Contemporaneously by me, independently viewed  No previous EKG available for comparison            MEDICATIONS GIVEN IN ER  Medications - No data to display                  MEDICAL DECISION MAKING, PROGRESS, and CONSULTS    All labs have been independently reviewed by me.  All radiology studies have been reviewed by me and I have also reviewed the radiology report.   EKG's independently viewed and interpreted by me.  Discussion below represents my analysis of pertinent findings related to patient's condition, differential diagnosis, treatment plan and final disposition.      Additional sources:  - Discussed/ obtained information from independent historians: History obtained from the patient herself at bedside.    - External (non-ED) record review: Upon medical records review, the patient was last seen and evaluated in the outpatient office of spine surgery on 1/16/2025 for evaluation of lumbar spinous pain with degenerative disc  disease.    - Chronic or social conditions impacting care: None reported    - Shared decision making: Discharge decision based on shared conversations have between myself as well as the patient at bedside.      Orders placed during this visit:  Orders Placed This Encounter   Procedures    Respiratory Panel PCR w/COVID-19(SARS-CoV-2) PATRICIA/SHANE/CARMINE/PAD/COR/KEVIN In-House, NP Swab in UTM/VTM, 2 HR TAT - Swab, Nasopharynx    XR Chest 1 View    Pearson Draw    Comprehensive Metabolic Panel    BNP    High Sensitivity Troponin T    CBC Auto Differential    D-dimer, Quantitative    High Sensitivity Troponin T 1Hr    Undress & Gown    Continuous Pulse Oximetry    Vital Signs    ECG 12 Lead ED Triage Standing Order; SOA    CBC & Differential    Green Top (Gel)    Lavender Top    Gold Top - SST    Light Blue Top           Differential diagnosis includes but is not limited to:    Sepsis, acute bacteremia, pneumonia, pneumothorax, pericarditis, pulmonary embolism, COVID-19, influenza, or viral upper respiratory infection      Independent interpretation of labs, radiology studies, and discussions with consultants:    Chest x-ray independently interpreted by myself with my interpretation showing no cardiomegaly nor area of edema, infiltrate, or pneumothorax.      ED Course as of 01/22/25 1554   Wed Jan 22, 2025   1021 On reevaluation, the patient is resting comfortably with stable vital signs and without any further complaints.  I did inform her that all of her tests are negative except the viral panel which did show positive for coronavirus.  We did discuss symptomatic treatment.  I will prescribe medication for the cough as well as a course of steroids.  Return instructions given and she is stable for discharge.  All questions answered. [BM]      ED Course User Index  [BM] Jose Mai MD         DIAGNOSIS  Final diagnoses:   Viral URI with cough   Coronavirus infection         DISPOSITION  DISCHARGE    Patient discharged in  stable condition.    Reviewed implications of results, diagnosis, meds, responsibility to follow up, warning signs and symptoms of possible worsening, potential complications and reasons to return to ER.    Patient/Family voiced understanding of above instructions.    Discussed plan for discharge, as there is no emergent indication for admission. Patient referred to primary care provider for BP management due to today's BP. Pt/family is agreeable and understands need for follow up and repeat testing.  Pt is aware that discharge does not mean that nothing is wrong but it indicates no emergency is present that requires admission and they must continue care with follow-up as given below or physician of their choice.     FOLLOW-UP  Rosalio Drake APRN  [address] [facility]  [address]  [place] [id number] [phone]    Schedule an appointment as soon as possible for a visit            Medication List        New Prescriptions      dextromethorphan 15 MG/5ML syrup  Take 10 mL by mouth 4 (Four) Times a Day As Needed for Cough.     predniSONE 50 MG tablet  Commonly known as: DELTASONE  Take 1 tablet by mouth Daily.               Where to Get Your Medications        These medications were sent to [id number] - [place], [place] - [address] [facility] - [phone] [facility] [phone]   [address], [facility] [address]      Phone: [phone]   dextromethorphan 15 MG/5ML syrup  predniSONE 50 MG tablet                   Latest Documented Vital Signs:  As of 15:54 EST  BP- 126/86 HR- 64 Temp- 96.8 °F (36 °C) O2 sat- 96%              --    Please note that portions of this were completed with a voice recognition program.       Note Disclaimer: At [facility], we believe that sharing information builds trust and better relationships. You are receiving this note because you are receiving care at [facility] or recently visited. It is possible you will see  health information before a provider has talked with you about it. This kind of information can be easy to misunderstand. To help you fully understand what it means for your health, we urge you to discuss this note with your provider.             Jose Mai MD  01/22/25 4499

## 2025-02-18 ENCOUNTER — OFFICE VISIT (OUTPATIENT)
Dept: OBSTETRICS AND GYNECOLOGY | Facility: CLINIC | Age: 40
End: 2025-02-18
Payer: COMMERCIAL

## 2025-02-18 VITALS
DIASTOLIC BLOOD PRESSURE: 73 MMHG | BODY MASS INDEX: 30.76 KG/M2 | WEIGHT: 191.4 LBS | SYSTOLIC BLOOD PRESSURE: 112 MMHG | HEIGHT: 66 IN | HEART RATE: 73 BPM

## 2025-02-18 DIAGNOSIS — Z30.431 IUD CHECK UP: Primary | ICD-10-CM

## 2025-02-18 DIAGNOSIS — T83.32XA INTRAUTERINE CONTRACEPTIVE DEVICE THREADS LOST, INITIAL ENCOUNTER: ICD-10-CM

## 2025-02-18 DIAGNOSIS — N83.202 LEFT OVARIAN CYST: ICD-10-CM

## 2025-02-18 DIAGNOSIS — N92.0 MENORRHAGIA WITH REGULAR CYCLE: ICD-10-CM

## 2025-02-18 NOTE — PROGRESS NOTES
"Chief Complaint  Follow-up (Pt here for IUD string check following insertion 9/17/2024.)    Subjective        Gabriela Alcazar presents to [facility]  History of Present Illness  Patient is here for IUD checkup.  She had a Mirena IUD inserted in September 2024, but never return for follow-up.  She previously had periods that were very heavy.  She reports that following the IUD insertion her periods have mostly been absent.  She says she does occasionally get some periods which are mild/light.    She denies significant pelvic pain at this time.    Patient's last menstrual period was 02/04/2025 (exact date).    The following portions of the patient's history were reviewed and updated as appropriate: allergies, current medications, past family history, past medical history, past social history, past surgical history, and problem list.    Objective   Vital Signs:  /73   Pulse 73   Ht 167.6 cm (66\")   Wt 86.8 kg (191 lb 6.4 oz)   BMI 30.89 kg/m²   Estimated body mass index is 30.89 kg/m² as calculated from the following:    Height as of this encounter: 167.6 cm (66\").    Weight as of this encounter: 86.8 kg (191 lb 6.4 oz).          Physical Exam   Gen: No acute distress, awake and oriented times three  Abdomen: soft, nontender, no masses or hernia, non distended, normoactive bowel sounds  Pelvic: Exam performed in the presence of a female chaperone  Patient has provided verbal consent to proceed with exam.  Normal external female genitalia, no lesions  Urethra: Normal meatus, no caruncle  Bladder: nontender  Vagina: No blood or discharge  Cervix: No cervical motion tenderness, no lesions, no active bleeding, nonfriable, IUD strings not visualized at external os today  External anal exam: Normal appearance, no lesions or hemorrhoids  Rectal: Deferred  Psych: Good judgement and insight, normal affect and mood    Result Review :             Component  Ref Range & Units 3 wk ago  (1/22/25) " 5 mo ago  (8/27/24) 1 yr ago  (11/27/23) 1 yr ago  (2/27/23)   WBC  3.40 - 10.80 10*3/mm3 9.55 10.2 R 9.6 R    RBC  3.77 - 5.28 10*6/mm3 5.01 4.84 R 4.57 R    Hemoglobin  12.0 - 15.9 g/dL 13.5 13.0 R 11.8 R    Hematocrit  34.0 - 46.6 % 40.7 41.3 36.2    MCV  79.0 - 97.0 fL 81.2 85 R 79 R    MCH  26.6 - 33.0 pg 26.9 26.9 25.8 Low     MCHC  31.5 - 35.7 g/dL 33.2 31.5 32.6    RDW  12.3 - 15.4 % 12.8 12.8 R 12.7 R    RDW-SD  37.0 - 54.0 fl 37.8      MPV  6.0 - 12.0 fL 10.1      Platelets  140 - 450 10*3/mm3 277 315 R 322 R      Ultrasound today:  Findings:  Uterus measures 8.38 x 5.10 x 3.92 cm, volume 87.720 cm cube  There are no intramural or subserosal fibroids or masses identified.  Endometrial thickness measures 0.72 cm, and is homogenous appearance without evidence of polyps.  An IUD is identified in the typical location within the endometrial cavity.  Cervix is normal-appearing.    Left ovary: 5.28 x 4.47 x 4.33 cm, volume 53.509 cm cube  There is a 4.7 x 3.4 cm unilocular cyst involving the left ovary.  Components appear to be fluid-filled, but somewhat heterogenous so may be more likely complex in nature versus simple cystic.  There is normal Doppler flow noted to the left ovary.  The cyst was not seen on the previous ultrasound from December 2023.    Right ovary: Not identified    There is a small amount of free fluid in the pelvis, more so on the left side     Impression:  Normal-appearing uterus with IUD in place.  4.7 cm complex left ovarian cyst is noted.  This was not previously seen on the ultrasound in December 2023.  Normal Doppler flow was noted to the left ovary.    Would recommend short interval follow-up in roughly 3 months.       Assessment and Plan   Diagnoses and all orders for this visit:    1. IUD check up (Primary)  -     US Non-ob Transvaginal    Ultrasound today shows that IUD is in the typical location.  Reassurance offered to the patient.      2. Intrauterine contraceptive device threads  lost, initial encounter  -     US Non-ob Transvaginal    3. Menorrhagia with regular cycle-improved with Mirena IUD    Symptoms have been well-controlled with the Mirena IUD.  Would recommend continue with this at this time.  Patient agrees with the plan.    4. Left ovarian cyst  -     US Non-ob Transvaginal; Future    New findings today of a 4-1/2 cm complex left ovarian cyst noted.  This was not previously seen on ultrasound in 2023, likely represents new development.  We have discussed the frontal diagnosis of ovarian cyst.  In a woman this age, this likely represents functional/physiologic cyst versus benign neoplasm.  Differential includes hemorrhagic corpus luteum, endometrioma, benign ovarian neoplasm such as serous or mucinous cystadenoma, or less likely ovarian malignancy.  Would recommend short interval follow-up to document resolution of the cyst.  Will plan to repeat in 3 months.  Patient agrees with the plan.  Warning signs given to the patient for ovarian torsion.  At this time there is good Doppler flow noted to the ovary, and the size of the ovary is still less than typically is associated with ovarian torsion.         Follow Up   Return GYN US and GYN FU (annual ) in 3 months.  Patient was given instructions and counseling regarding her condition or for health maintenance advice. Please see specific information pulled into the AVS if appropriate.

## 2025-05-10 ENCOUNTER — HOSPITAL ENCOUNTER (EMERGENCY)
Facility: HOSPITAL | Age: 40
Discharge: HOME | End: 2025-05-10
Attending: EMERGENCY MEDICINE
Payer: COMMERCIAL

## 2025-05-10 VITALS
RESPIRATION RATE: 18 BRPM | SYSTOLIC BLOOD PRESSURE: 134 MMHG | HEIGHT: 67 IN | HEART RATE: 69 BPM | BODY MASS INDEX: 29.66 KG/M2 | OXYGEN SATURATION: 99 % | WEIGHT: 189 LBS | TEMPERATURE: 98.6 F | DIASTOLIC BLOOD PRESSURE: 89 MMHG

## 2025-05-10 DIAGNOSIS — H65.92 LEFT OTITIS MEDIA WITH EFFUSION: Primary | ICD-10-CM

## 2025-05-10 DIAGNOSIS — H69.92 DYSFUNCTION OF LEFT EUSTACHIAN TUBE: ICD-10-CM

## 2025-05-10 PROCEDURE — 99282 EMERGENCY DEPT VISIT SF MDM: CPT

## 2025-05-10 RX ORDER — FLUTICASONE PROPIONATE 50 MCG
2 SPRAY, SUSPENSION (ML) NASAL DAILY
Qty: 16 G | Refills: 0 | Status: SHIPPED | OUTPATIENT
Start: 2025-05-10

## 2025-05-10 RX ORDER — CETIRIZINE HYDROCHLORIDE 10 MG/1
10 TABLET ORAL DAILY
Qty: 15 TABLET | Refills: 0 | Status: SHIPPED | OUTPATIENT
Start: 2025-05-10

## 2025-05-10 NOTE — DISCHARGE INSTRUCTIONS
How to use a nasal spray -- Nasal sprays work best when they are used properly and the medication remains in the nose rather than draining down the back of the throat. If your nose is crusted or contains mucus, you can clean it with a saline (salt water) nasal spray before using a nasal spray that contains medication.  Hold your head straight or with your chin slightly tucked. Point the spray away from the nasal septum (the cartilage that divides the two sides of the nose). After spraying, sniff gently to pull it into the higher parts of your nose. Avoid sniffing too hard, as this can result in the medicine draining down your throat.  Some people find that holding one nostril closed with a finger improves their ability to draw the spray into the upper nose. Spit out any medicine that drains into your throat since it is not effective unless it remains in the nose.

## 2025-05-10 NOTE — ED PROVIDER NOTES
Pt presents to the ED c/o 2 days of left ear pain.  Denies any recent cough, congestion, facial pressure, other illness, recent travel.  Tylenol ibuprofen minimally effective.     On exam,   General: No acute distress, nontoxic  HEENT: EOMI, left middle ear serous effusion without otitis media  Pulm: Symmetric chest rise, nonlabored breathing  CV: Regular rate and rhythm  GI: Nondistended  MSK: No deformity  Skin: Warm, dry  Neuro: Awake, alert, oriented x 4, moving all extremities, no focal deficits  Psych: Calm, cooperative    Vital signs and nursing notes reviewed.           Plan: Plan to continue with anti-inflammatories, will start on Zyrtec and Flonase, outpatient follow-up.  ED return for worsening symptoms as needed.  No need for further testing or management emergency department at this time.             MD Attestation Note    SHARED VISIT: This visit was performed by BOTH a physician and an APC. The substantive portion of the medical decision making was performed by this attesting physician who made or approved the management plan and takes responsibility for patient management. All studies in the APC note (if performed) were independently interpreted by me.                   Gato French MD  05/10/25 9014

## 2025-05-16 NOTE — ED PROVIDER NOTES
EMERGENCY DEPARTMENT ENCOUNTER  Room Number:    PCP: Rosalio Drake APRN  Independent Historians: Patient      HPI:  Chief Complaint: had concerns including Earache.     A complete HPI/ROS/PMH/PSH/SH/FH are unobtainable due to: None    Chronic or social conditions impacting patient care (Social Determinants of Health): None      Context: The patient is a 39 y.o. female with a medical history of migraines and menorrhagia who presents to the ED c/o acute left-sided ear pain for the last 2 days.  Feels like pressure.  Denies any recent cough, nasal congestion or rhinorrhea, sore throat, fevers, trauma.  She is taking Tylenol and ibuprofen without adequate relief.  No significant history of ear infections.  No other complaints at this time.      Review of prior external notes (non-ED) -and- Review of prior external test results outside of this encounter:  Labs performed 2025 and creatinine was 0.79, sodium 137, hemoglobin 13.5        PAST MEDICAL HISTORY  Active Ambulatory Problems     Diagnosis Date Noted    Menorrhagia with regular cycle 2023    Iron deficiency anemia due to chronic blood loss 2023     Resolved Ambulatory Problems     Diagnosis Date Noted    No Resolved Ambulatory Problems     Past Medical History:   Diagnosis Date    Migraines          PAST SURGICAL HISTORY  Past Surgical History:   Procedure Laterality Date    BREAST AUGMENTATION      BREAST SURGERY      implant removal     SECTION WITH TUBAL           FAMILY HISTORY  Family History   Problem Relation Age of Onset    Breast cancer Maternal Aunt     Ovarian cancer Neg Hx     Uterine cancer Neg Hx     Colon cancer Neg Hx          SOCIAL HISTORY  Social History     Socioeconomic History    Marital status: Single   Tobacco Use    Smoking status: Never    Smokeless tobacco: Never   Vaping Use    Vaping status: Never Used   Substance and Sexual Activity    Alcohol use: Never    Drug use: Never    Sexual activity: Yes      Partners: Male     Birth control/protection: Tubal ligation         ALLERGIES  Patient has no known allergies.      REVIEW OF SYSTEMS  Review of Systems  Included in HPI  All systems reviewed and negative except for those discussed in HPI.      PHYSICAL EXAM    I have reviewed the triage vital signs and nursing notes.    ED Triage Vitals [05/10/25 0618]   Temp Heart Rate Resp BP SpO2   98.6 °F (37 °C) 88 16 120/87 99 %      Temp src Heart Rate Source Patient Position BP Location FiO2 (%)   Tympanic Monitor Sitting Right arm --       Physical Exam  GENERAL: alert, no acute distress  SKIN: Warm, dry  HENT: Normocephalic, atraumatic.  Right TM unremarkable with no effusion or erythema, left TM is dull, no erythema, no purulent effusion, there is a clear effusion bilaterally.  Canals are unremarkable without edema erythema or drainage.  Posterior oropharynx clear moist, no erythema or edema.  EYES: no scleral icterus  CV: regular rhythm, regular rate  RESPIRATORY: normal effort, lungs clear  ABDOMEN: nondistended  MUSCULOSKELETAL: no deformity  NEURO: alert, moves all extremities, follows commands            LAB RESULTS  No results found for this or any previous visit (from the past 24 hours).      RADIOLOGY  No Radiology Exams Resulted Within Past 24 Hours      MEDICATIONS GIVEN IN ER  Medications - No data to display      ORDERS PLACED DURING THIS VISIT:  No orders of the defined types were placed in this encounter.        OUTPATIENT MEDICATION MANAGEMENT:  No current Epic-ordered facility-administered medications on file.     Current Outpatient Medications Ordered in Epic   Medication Sig Dispense Refill    cetirizine (zyrTEC) 10 MG tablet Take 1 tablet by mouth Daily. 15 tablet 0    fluticasone (FLONASE) 50 MCG/ACT nasal spray Administer 2 sprays into the nostril(s) as directed by provider Daily. 16 g 0    Levonorgestrel (MIRENA) 20 MCG/DAY intrauterine device IUD To be inserted one time by prescriber. Route  intrauterine.      Qulipta 60 MG tablet take 1 tablet by mouth 1 time each day      Ubrelvy 100 MG tablet TAKE 1 TABLET BY MOUTH IF NEEDED FOR MIGRAINE           PROCEDURES  Procedures            PROGRESS, DATA ANALYSIS, CONSULTS, AND MEDICAL DECISION MAKING  All labs have been independently interpreted by me.  All radiology studies have been reviewed by me. All EKG's have been independently viewed and interpreted by me.  Discussion below represents my analysis of pertinent findings related to patient's condition, differential diagnosis, treatment plan and final disposition.    DIFFERENTIAL    My differential diagnosis includes but is not limited to allergic rhinitis, eustachian tube dysfunction, otitis media    Clinical Scores:                       On exam the patient has otitis media with effusion, no evidence of acute infection.  No other symptoms.  Will treat with Zyrtec and Flonase for suspected eustachian tube dysfunction.  Follow-up and return precautions discussed.    AS OF 16:02 EDT VITALS:    BP - 134/89  HR - 69  TEMP - 98.6 °F (37 °C) (Oral)  O2 SATS - 99%    COMPLEXITY OF CARE  Admission was considered but after careful review of the patient's presentation, physical examination, diagnostic results, and response to treatment the patient may be safely discharged with outpatient follow-up.      DIAGNOSIS  Final diagnoses:   Left otitis media with effusion   Dysfunction of left eustachian tube         DISPOSITION  ED Disposition       ED Disposition   Discharge    Condition   Stable    Comment   --                  FOLLOW UP  Rosalio Drake, GHULAM  [address] [id number] [phone]    In 1 week  As needed        Prescribed Medications     Medication List        New Prescriptions      fluticasone 50 MCG/ACT nasal spray  Commonly known as: FLONASE  Administer 2 sprays into the nostril(s) as directed by provider Daily.               Where to Get Your Medications        These  medications were sent to [facility] [place] [address] [address] [facility] [address]      Hours: Monday to Friday 7 AM to 6 PM, Saturday & Sunday 8 AM to 4:30 PM (Closed 12 PM to 12:30 PM) Phone: [phone]   cetirizine 10 MG tablet  fluticasone 50 MCG/ACT nasal spray                   Please note that portions of this document were completed with a voice recognition program.    Note Disclaimer: At [facility], we believe that sharing information builds trust and better relationships. You are receiving this note because you recently visited [facility]. It is possible you will see health information before a provider has talked with you about it. This kind of information can be easy to misunderstand. To help you fully understand what it means for your health, we urge you to discuss this note with your provider.

## 2025-05-18 RX ORDER — CETIRIZINE HYDROCHLORIDE 10 MG/1
10 TABLET ORAL DAILY
Qty: 15 TABLET | Refills: 0 | OUTPATIENT
Start: 2025-05-18

## 2025-05-19 NOTE — PROGRESS NOTES
"Chief Complaint  Follow-up (Pt here for US f/u.)    Subjective        Gabriela lAcazar presents to [facility]  History of Present Illness  Patient is here today for follow-up of the left ovarian cyst that was noted at the time of her routine ultrasound check up following IUD insertion.  She currently has a Mirena IUD in place.  She reports that she is still having menstrual cycles with the IUD but they are very light/scant.  Ultrasound was performed earlier today.  Please see results below.    Patient is also having concerns about sweats at night.  She is wondering if this could be related to menopause.  She says this makes it difficult to sleep at night.  She said that in the past when she was using Depo-Provera for contraception her previous gynecologist told her that she had to stop the Depo-Provera because she was getting perimenopausal type symptoms.  Objective   Vital Signs:  /78   Pulse 76   Ht 170.2 cm (67\")   Wt 87.7 kg (193 lb 6.4 oz)   BMI 30.29 kg/m²   Estimated body mass index is 30.29 kg/m² as calculated from the following:    Height as of this encounter: 170.2 cm (67\").    Weight as of this encounter: 87.7 kg (193 lb 6.4 oz).          Physical Exam   Gen: No acute distress, awake and oriented times three  Abdomen: soft, nontender, no masses or hernia, non distended, normoactive bowel sounds  Pelvic: Exam performed in the presence of a female chaperone  Patient has provided verbal consent to proceed with exam.  Normal external female genitalia, no lesions  Urethra: Normal meatus, no caruncle  Bladder: nontender  Vagina: No blood or discharge  Cervix: No cervical motion tenderness, no lesions, no active bleeding, nonfriable  Uterus: Anteverted, normal size and shape, nontender  Adnexa: No masses or tenderness  External anal exam: Normal appearance, no lesions or hemorrhoids  Rectal: Deferred  Psych: Good judgement and insight, normal affect and mood    Result Review " :             US today:  Findings:  Uterus measures 8.54 x 4.60 x 4.31 cm, volume 88.653 cm cube  There are no intramural or subserosal fibroids or masses identified.  Endometrial thickness measures 0.89 cm, and is homogenous appearance without evidence of polyps.  Cervix is normal-appearing.     Left ovary: 2.98 x 2.45 x 2.46 cm, volume 9.404 cm cube  The previously noted 4.7 cm complex cyst is no longer seen at this time.  There is a small 2.0 x 2.0 cm round simple cyst versus follicle.     Right ovary: 2.76 x 1.95 x 2.03 cm, volume 5.721 cm cube  There is no adnexal mass or cyst identified.     There is no free fluid.     Impression:     Normal pelvic ultrasound.  Previously noted 4.7 cm complex left ovarian cyst is no longer seen at this time.  There is a small 2.0 cm simple cyst the left ovary which is likely physiologic in nature.     Assessment and Plan   Diagnoses and all orders for this visit:    1. Left ovarian cyst (Primary)-resolved  Previously noted complex left ovarian cyst is resolved at this time    Patient primary care follow-up is recommended.  2. Hot flashes  -     Follicle Stimulating Hormone  -     Estradiol  Close this is currently related to menopause.  Will check FSH and estradiol.  If labs are consistent with perimenopause or post menopause, we have discussed various treatment options.  We discussed diet and lifestyle modifications.  Advised minimization of caffeine.  Patient states that she needs to drink caffeine daily to help with her chronic migraines.  We have also discussed use of medication such as SSRIs, neurokinin receptor modulators, and estrogen replacement.  She declines these at this time.    3. Screening for malignant neoplasm of cervix  -     IGP,CtNgTv,Apt HPV,rfx 16 / 18,45    4. Screen for sexually transmitted diseases  -     IGP,CtNgTv,Apt HPV,rfx 16 / 18,45    5. Chronic migraine without aura without status migrainosus, not intractable  -     magnesium oxide (MAG-OX) 400  MG tablet; Take 1 tablet by mouth Daily.  Dispense: 30 tablet; Refill: 11    Patient reports needing to drink 2 caffeinated drinks a day to help keep her migraines at bay.  This may be contributing to her hot flashes.  Could try to start magnesium oxide as a form of headache prevention to see if this is helpful.  If not, she may continue to use her caffeine.           Follow Up   Return in about 1 year (around 5/20/2026) for Needs to do blood work today.  Patient was given instructions and counseling regarding her condition or for health maintenance advice. Please see specific information pulled into the AVS if appropriate.

## 2025-05-20 ENCOUNTER — OFFICE VISIT (OUTPATIENT)
Dept: OBSTETRICS AND GYNECOLOGY | Facility: CLINIC | Age: 40
End: 2025-05-20
Payer: COMMERCIAL

## 2025-05-20 VITALS
SYSTOLIC BLOOD PRESSURE: 114 MMHG | HEIGHT: 67 IN | DIASTOLIC BLOOD PRESSURE: 78 MMHG | HEART RATE: 76 BPM | BODY MASS INDEX: 30.35 KG/M2 | WEIGHT: 193.4 LBS

## 2025-05-20 DIAGNOSIS — N83.202 LEFT OVARIAN CYST: Primary | ICD-10-CM

## 2025-05-20 DIAGNOSIS — Z11.3 SCREEN FOR SEXUALLY TRANSMITTED DISEASES: ICD-10-CM

## 2025-05-20 DIAGNOSIS — Z12.4 SCREENING FOR MALIGNANT NEOPLASM OF CERVIX: ICD-10-CM

## 2025-05-20 DIAGNOSIS — G43.709 CHRONIC MIGRAINE WITHOUT AURA WITHOUT STATUS MIGRAINOSUS, NOT INTRACTABLE: ICD-10-CM

## 2025-05-20 DIAGNOSIS — R23.2 HOT FLASHES: ICD-10-CM

## 2025-05-20 PROCEDURE — 99213 OFFICE O/P EST LOW 20 MIN: CPT | Performed by: OBSTETRICS & GYNECOLOGY

## 2025-05-20 PROCEDURE — 1159F MED LIST DOCD IN RCRD: CPT | Performed by: OBSTETRICS & GYNECOLOGY

## 2025-05-20 PROCEDURE — 1160F RVW MEDS BY RX/DR IN RCRD: CPT | Performed by: OBSTETRICS & GYNECOLOGY

## 2025-05-20 PROCEDURE — 99459 PELVIC EXAMINATION: CPT | Performed by: OBSTETRICS & GYNECOLOGY

## 2025-05-20 RX ORDER — AZELASTINE HYDROCHLORIDE, FLUTICASONE PROPIONATE 137; 50 UG/1; UG/1
1 SPRAY, METERED NASAL 2 TIMES DAILY
COMMUNITY
Start: 2024-12-29

## 2025-05-20 RX ORDER — MAGNESIUM OXIDE 400 MG/1
400 TABLET ORAL DAILY
Qty: 30 TABLET | Refills: 11 | Status: SHIPPED | OUTPATIENT
Start: 2025-05-20

## 2025-05-21 LAB
ESTRADIOL SERPL-MCNC: 339 PG/ML
FSH SERPL-ACNC: 6 MIU/ML

## 2025-05-23 LAB
C TRACH RRNA CVX QL NAA+PROBE: NEGATIVE
CYTOLOGIST CVX/VAG CYTO: NORMAL
CYTOLOGY CVX/VAG DOC CYTO: NORMAL
CYTOLOGY CVX/VAG DOC THIN PREP: NORMAL
DX ICD CODE: NORMAL
HPV GENOTYPE REFLEX: NORMAL
HPV I/H RISK 4 DNA CVX QL PROBE+SIG AMP: NEGATIVE
N GONORRHOEA RRNA CVX QL NAA+PROBE: NEGATIVE
OTHER STN SPEC: NORMAL
SERVICE CMNT-IMP: NORMAL
STAT OF ADQ CVX/VAG CYTO-IMP: NORMAL
T VAGINALIS RRNA SPEC QL NAA+PROBE: NEGATIVE